# Patient Record
Sex: FEMALE | Race: WHITE
[De-identification: names, ages, dates, MRNs, and addresses within clinical notes are randomized per-mention and may not be internally consistent; named-entity substitution may affect disease eponyms.]

---

## 2019-02-06 ENCOUNTER — HOSPITAL ENCOUNTER (OUTPATIENT)
Dept: HOSPITAL 80 - FSGY | Age: 29
Discharge: HOME | End: 2019-02-06
Attending: OBSTETRICS & GYNECOLOGY
Payer: COMMERCIAL

## 2019-02-06 VITALS — DIASTOLIC BLOOD PRESSURE: 83 MMHG | SYSTOLIC BLOOD PRESSURE: 131 MMHG

## 2019-02-06 DIAGNOSIS — R10.2: ICD-10-CM

## 2019-02-06 DIAGNOSIS — N80.3: Primary | ICD-10-CM

## 2019-02-06 DIAGNOSIS — N80.5: ICD-10-CM

## 2019-02-06 DIAGNOSIS — R35.0: ICD-10-CM

## 2019-02-06 DIAGNOSIS — R39.15: ICD-10-CM

## 2019-02-06 DIAGNOSIS — R11.0: ICD-10-CM

## 2019-02-06 DIAGNOSIS — N94.10: ICD-10-CM

## 2019-02-06 DIAGNOSIS — N94.6: ICD-10-CM

## 2019-02-06 DIAGNOSIS — N80.1: ICD-10-CM

## 2019-02-06 RX ADMIN — HYDROCODONE BITARTRATE AND ACETAMINOPHEN PRN TAB: 5; 325 TABLET ORAL at 14:10

## 2019-02-06 RX ADMIN — HYDROCODONE BITARTRATE AND ACETAMINOPHEN PRN TAB: 5; 325 TABLET ORAL at 15:01

## 2019-02-06 RX ADMIN — Medication PRN MCG: at 13:56

## 2019-02-06 RX ADMIN — Medication PRN MCG: at 14:05

## 2019-02-06 NOTE — PDANEPAE
ANE History of Present Illness


endometrial ablation








ANE Past Medical History





- Cardiovascular History


Hx Hypertension: No


Hx Arrhythmias: No


Hx Chest Pain: No


Hx Coronary Artery / Peripheral Vascular Disease: No


Hx CHF / Valvular Disease: No


Hx Palpitations: No





- Pulmonary History


Hx COPD: No


Hx Asthma/Reactive Airway Disease: No


Hx Recent Upper Respiratory Infection: No


Hx Oxygen in Use at Home: No


Hx Sleep Apnea: No


Sleep Apnea Screening Result - Last Documented: Negative


Pulmonary History Comment: bronchitis in winter uses albuterol





- Neurologic History


Hx Cerebrovascular Accident: No


Hx Seizures: No


Hx Dementia: No





- Endocrine History


Hx Diabetes: No





- Renal History


Hx Renal Disorders: No





- Liver History


Hx Hepatic Disorders: No





- Neurological & Psychiatric Hx


Hx Neurological and Psychiatric Disorders: Yes


Neurological / Psychiatric History Comment: anxiety/depression





- Cancer History


Hx Cancer: No





- Congenital Disorder History


Hx Congenital Disorders: No





- GI History


Hx Gastrointestinal Disorders: Yes


Gastrointestinal History Comment: IBS vs endometriosis





- Other Health History


Other Health History: endometriosis.  mild anemia





- Chronic Pain History


Chronic Pain: Yes (pelvic pain)





- Surgical History


Prior Surgeries: none in last 5 yrs





ANE Review of Systems


Review of Systems: 








- Exercise capacity


METS (RN): 4 METS





ANE Patient History





- Allergies


Allergies/Adverse Reactions: 








No Known Allergies Allergy (Verified 01/17/19 16:49)


 








- Home Medications


Home Medications: 








NK [No Known Home Meds]  01/17/19 [Last Taken Unknown]








- NPO status


NPO Status: no food or drink >8 hours


NPO Since - Liquids (Date): 02/06/19


NPO Since - Liquids (Time): 10:15


NPO Since - Solids (Date): 02/05/19


NPO Since - Solids (Time): 20:00





- Anes Hx


Anes Hx: no prior problems





- Smoking Hx


Smoking Status: Heavy smoker





- Alcohol Use


Alcohol Use: Rarely





- Family Anes Hx


Family Anes Hx: none


Family Hx Anesthesia Complications: none





ANE Labs/Vital Signs





- Vital Signs


Vital Signs: reviewed preoperatively; see RN documention for details


Blood Pressure: 123/80


Heart Rate: 62


Respiratory Rate: 22


O2 Sat (%): 96


Height: 167.64 cm


Weight: 78.925 kg





ANE Physical Exam





- Airway


Neck exam: FROM


Mallampati Score: Class 1


Mouth exam: normal dental/mouth exam





- Pulmonary


Pulmonary: no respiratory distress, clear to auscultation





- Cardiovascular


Cardiovascular: regular rate and rhythym, no murmur, rub, or gallop





- ASA Status


ASA Status: II





ANE Anesthesia Plan


Anesthesia Plan: general endotracheal anesthesia

## 2019-02-06 NOTE — POSTOPPROG
Post Op Note


Date of Operation: 02/06/19


Surgeon: Pipo Guido


Assistant: Cathy Cortez


Anesthesia: GET(General Endotracheal)


Pre-op Diagnosis: Endometriosis


Post-op Diagnosis: Same


Procedure: Robotic excision of endo, bilat ureterolysis and ovarianpex


Findings: Endo


Inf/Abcess present in the surg proc area at time of surgery?: No


EBL: Minimal


Complications: 





None

## 2019-02-06 NOTE — PDGENHP
History and Physical


History and Physical: 





Assessment and Plan:





1. Pelvic pain











Ines has symptoms and exam concerning for endometriosis.  We reviewed all 

conservative and surgical options.  At the end of our discussion, she wishes to 

move forward with surgical excision of endometriosis with Dr. Guido.  We 

discussed that she will see him preoperatively for an exam.  In the meantime, I 

have recommended that she continue ibuprofen for pain relief.  She cannot take 

any estrogen-based birth controls due to a history of migraines.











2. Nausea











3. Dyschezia











4. Dyspareunia, female











Subjective:











Patient ID: Ines is a 28 y.o. female who presents to [facility] for endometriosis consult.











HPI





Ines Caro presents for a preoperative visit. She is scheduled for a robotic 

excision of endometriosis. The risks, benefits, and alternatives were presented 

and informed consent was obtained.  40 minutes of this 40 minute appointment 

was spent counceling, reviewing the procedure in detail, and discussing the 

preoperative and postoperative instructions. Below is a copy of our prior visit 

note.











Zeinab is a 27-year-old para 0 here as a self-referral for endometriosis consult.

  Her providers have suspected endometriosis for a long time.











Ines's menses started at age 14 and they were painful from the beginning.  She 

often had to miss school.  She had a lot of nausea and vomiting with the pain.  

Her periods have always been irregular.  At age 23, she noticed that her pain 

was worsening.  She began having a lot of GI symptoms and worsening anxiety and 

depression.  She was also having a lot of back pain that was initially 

diagnosed as a pinched nerve at L5.  This was without an MRI.  She was given 

muscle relaxers that kind of helped.  She also has a lot of nausea with 

vomiting with pain she feels as if stress and travel exacerbates this.  She has 

a lot of stomach cramping that feels like it is in her diaphragm.  She has a 

lot of low back stiffness.  All of these pains are worse with ovulation, menses

, but also occur randomly during the month.  On her periods, she feels like she 

has a UTI.  She feels low midline pain that radiates out to her back and her 

hips.  Painful to sit, she has increased sciatic pain as well.  She feels a lot 

of cramping more on the left than the right.  She also feels an ovarian pain 

that feels like a pulling sensation with ovulation as well as menses.  She is 

currently working 3 jobs and missing a lot of work due to the pain.  She was 

previously on Depakote, but has been off for about 9 months.  She had a lot of 

issues with regulating her emotions and GI side effects with the Depakote, but 

it did not help her bleeding.  She was also needing Lexapro during that time, 

but has since stopped that and feels much better from a mental health 

perspective.











She has dyschezia which is worse with menses and ovulation.  She notes that it 

feels like broken glass in her abdomen.  She also feels like she has blood in 

her stools on her menses.  She is never had a GI workup.  She has painful 

intercourse that feels deep with penetration, and is a deep pain that lasts.  

Often times a hot shower is the only thing that will relieve that pain.  She 

notices that heat helps most of her pain, as well as marijuana.











Ines lives in [place], and is working 3 jobs including working at a  

office and a .  She is on her feet most of the day.  She takes ibuprofen 

for her pain.  She recently had a pelvic ultrasound with [facility], 

but those results are not available today.  She will request those results to 

have them sent here.





 





 











CURRENT MEDICATIONS: 





Current Outpatient Medications





Medication   Sig





   ibuprofen (ADVIL,MOTRIN) 200 mg tablet   Take 800 mg by mouth 3 times daily 

as needed. 





   sumatriptan (IMITREX) 50 mg tablet   











No current facility-administered medications for this visit. 

















ALLERGIES: Patient has no known allergies.











I have reviewed, verified and agree with the past medical, surgical and birth 

history as documented by the MA today.











Review of Systems 





Constitutional: Positive for fatigue. 





Gastrointestinal: Positive for abdominal distention, abdominal pain and nausea. 





Genitourinary: Positive for dyspareunia, menstrual problem and pelvic pain. 





Musculoskeletal: Positive for arthralgias, back pain and myalgias. 

















Objective:





Vital Signs: 





Visit Vitals





Ht   1.626 m (5' 4")





Wt   79.3 kg (174 lb 12.8 oz)





BMI   30.00 kg/m





 











Physical Exam 





Constitutional: She is oriented to person, place, and time. She appears well-

developed and well-nourished. 





Pulmonary/Chest: Effort normal. 





Abdominal: Soft. 





Musculoskeletal: Normal range of motion. 





Neurological: She is alert and oriented to person, place, and time. 





Skin: Skin is warm and dry. 





Psychiatric: She has a normal mood and affect. Her behavior is normal. 





Pelvic: Normal external genitalia. Tenderness bilateral uterosacral ligaments. 

Tenderness posterior cervix. Uterus mobile, no masses. Unable to palpate 

bilateral adnexa, no masses noted.

















Procedures











DATA:





N/A











  





TIME/COUNSELING:





I personally spent a total of 45 minutes. Of that 45 minutes was counseling/

coordination of patient's care. See my note above for details.











Pipo Guido MD

## 2019-02-06 NOTE — GOP
[f rep st]



                                                                OPERATIVE REPORT





DATE OF OPERATION:  02/06/2019



SURGEON:  Pipo Guido MD



ASSISTANT:  Cathy Cortez CFA



ANESTHESIA:  1. General.



PREOPERATIVE DIAGNOSIS:  

1.  Dysmenorrhea.

2.  Endometriosis.

3.  Midcycle pain.

4.  Urinary frequency and urgency.

5.  Dyschezia.



POSTOPERATIVE DIAGNOSIS:  

1.  Dysmenorrhea.

2.  Endometriosis.

3.  Midcycle pain.

4.  Urinary frequency and urgency.

5.  Dyschezia.



PROCEDURE PERFORMED:  

1.  Robotic excision of endometriosis in the posterior cul-de-sac, bilateral pelvic side walls.

2.  Bilateral ureterolysis.

3.  Excision of rectal lesion.

4.  Bilateral ovariopexy.

5.  Cystoscopy.



FINDINGS:  



SPECIMENS:  

1.  Pelvic peritoneum with endometriosis.

1.  Rectal lesions.



2.  ESTIMATED BLOOD LOSS:  Scant.



DESCRIPTION OF PROCEDURE:  The patient was taken to the operating room where she was identified.  Gen
eral anesthesia was administered and found to be adequate.  She was placed in the lithotomy position 
and prepared and draped in normal sterile fashion.  A Villalta catheter was placed in her bladder.  A Hu
lka tenaculum was placed in the uterus for manipulation. 



A 1 cm infraumbilical incision was made with a scalpel.  The Veress needle with the CO2 gas flowing w
as advanced into the peritoneal cavity.  The abdomen was then insufflated with carbon dioxide gas.  T
he 12 mm trocar, followed by the laparoscope were then inserted.  The upper abdomen was unremarkable.
  There was no evidence of endometriosis on either diaphragm, liver, stomach, gallbladder, or upper a
bdominal bowel.  Two lateral ports were placed in the right, 1 on the left under direct visualization
.  She then was placed in Trendelenburg position and the da Bela robot docked on the left side.  The
 instruments were then brought into the abdominal cavity under direct visualization. 



Within the pelvis, there were several small spots of endometriosis in the anterior cul-de-sac.  She h
ad more in the posterior cul-de-sac, especially along the uterosacral ligaments and the ovarian fossa
e.  She had several lesions on the distal rectum.  She had no significant lesions on the ovaries, nor
 uterine serosa.  The lesions in the anterior cul-de-sac were treated, followed by the ovaries.  A bi
lateral ovariopexy was then performed by attaching each ovary to the ipsilateral round ligaments near
 the internal inguinal ring with 3-0 Vicryl Rapide suture.  The 2 lesions on the distal rectum were e
xcised with the robotic scissors.  These extended approximately 1/3 the thickness of the muscularis. 
 The entire posterior cul-de-sac peritoneum was then excised from the distal rectum up to the cervix 
and laterally to the uterosacral ligaments.  A bilateral ureterolysis was required given the endometr
iosis overlying both ureters.  The peritoneum at the pelvic brims was incised.  The ureters were gent
ly dissected free and lateralized off the overlying peritoneum and endometriosis from the pelvic brim
 down to the uterine arteries.  Once this was accomplished, the entire pelvic sidewall peritoneum was
 completely excised.  The pelvis was then irrigated with sterile saline, and hemostasis was present. 
 The robot was then undocked.  The fascia was closed with 0 Vicryl, skin with 4-0 Monocryl. 



Given her urinary frequency and urgency, cystoscopy was performed.  Both ureters had vigorous jets of
 urine.  There was no evidence of bladder, nor urethral injury seen.  No obvious pathology was seen t
o account for the patient's urinary symptoms.  Anesthesia was then reversed.  The patient taken to [facility] PACU awake, in stable condition.



COMPLICATIONS:  None.



DISPOSITION:  Patient stable to PACU.





Job #:  [id number]